# Patient Record
Sex: MALE | URBAN - METROPOLITAN AREA
[De-identification: names, ages, dates, MRNs, and addresses within clinical notes are randomized per-mention and may not be internally consistent; named-entity substitution may affect disease eponyms.]

---

## 2021-05-12 ENCOUNTER — TELEPHONE (OUTPATIENT)
Dept: ORTHOPEDICS | Facility: CLINIC | Age: 66
End: 2021-05-12

## 2021-05-12 NOTE — TELEPHONE ENCOUNTER
Elke from [facility] [place] calling for Dr Los Gastelum, [facility] [place]. Dr Gastelum requesting that Dr Rafael Kendrick call him on his cell... - cell [phone]    Will fwd to MD to see if he is involved in this pt's care.    Kaykay DUVAL RN Specialty Triage 5/12/2021 2:31 PM